# Patient Record
Sex: FEMALE | Race: BLACK OR AFRICAN AMERICAN
[De-identification: names, ages, dates, MRNs, and addresses within clinical notes are randomized per-mention and may not be internally consistent; named-entity substitution may affect disease eponyms.]

---

## 2019-08-14 ENCOUNTER — HOSPITAL ENCOUNTER (EMERGENCY)
Dept: HOSPITAL 10 - E/R | Age: 29
LOS: 1 days | Discharge: HOME | End: 2019-08-15
Payer: COMMERCIAL

## 2019-08-14 ENCOUNTER — HOSPITAL ENCOUNTER (EMERGENCY)
Dept: HOSPITAL 91 - E/R | Age: 29
LOS: 1 days | Discharge: HOME | End: 2019-08-15
Payer: COMMERCIAL

## 2019-08-14 VITALS
BODY MASS INDEX: 23.15 KG/M2 | WEIGHT: 135.58 LBS | HEIGHT: 64 IN | WEIGHT: 135.58 LBS | BODY MASS INDEX: 23.15 KG/M2 | HEIGHT: 64 IN

## 2019-08-14 DIAGNOSIS — F99: Primary | ICD-10-CM

## 2019-08-14 LAB
ACETAMINOPHEN: < 10 UG/ML (ref 10–30)
ADD MAN DIFF?: NO
ADD UMIC: YES
ALANINE AMINOTRANSFERASE: 18 IU/L (ref 13–69)
ALBUMIN/GLOBULIN RATIO: 1.06
ALBUMIN: 4.8 G/DL (ref 3.3–4.9)
ALKALINE PHOSPHATASE: 98 IU/L (ref 42–121)
ANION GAP: 13 (ref 5–13)
ASPARTATE AMINO TRANSFERASE: 34 IU/L (ref 15–46)
BASOPHIL #: 0.1 10^3/UL (ref 0–0.1)
BASOPHILS %: 0.9 % (ref 0–2)
BILIRUBIN,DIRECT: 0 MG/DL (ref 0–0.2)
BILIRUBIN,TOTAL: 1.1 MG/DL (ref 0.2–1.3)
BLOOD UREA NITROGEN: 13 MG/DL (ref 7–20)
CALCIUM: 9.9 MG/DL (ref 8.4–10.2)
CARBON DIOXIDE: 23 MMOL/L (ref 21–31)
CHLORIDE: 105 MMOL/L (ref 97–110)
CREATININE: 0.58 MG/DL (ref 0.44–1)
EOSINOPHILS #: 0.1 10^3/UL (ref 0–0.5)
EOSINOPHILS %: 1.4 % (ref 0–7)
ETHANOL: < 10 MG/DL (ref 0–0)
GLOBULIN: 4.5 G/DL (ref 1.3–3.2)
GLUCOSE: 85 MG/DL (ref 70–220)
HEMATOCRIT: 42.5 % (ref 37–47)
HEMOGLOBIN: 13.7 G/DL (ref 12–16)
IMMATURE GRANS #M: 0.02 10^3/UL (ref 0–0.03)
IMMATURE GRANS % (M): 0.2 % (ref 0–0.43)
LYMPHOCYTES #: 3.1 10^3/UL (ref 0.8–2.9)
LYMPHOCYTES %: 35.2 % (ref 15–51)
MEAN CORPUSCULAR HEMOGLOBIN: 31.2 PG (ref 29–33)
MEAN CORPUSCULAR HGB CONC: 32.2 G/DL (ref 32–37)
MEAN CORPUSCULAR VOLUME: 96.8 FL (ref 82–101)
MEAN PLATELET VOLUME: 10.2 FL (ref 7.4–10.4)
MONOCYTE #: 1 10^3/UL (ref 0.3–0.9)
MONOCYTES %: 11.5 % (ref 0–11)
NEUTROPHIL #: 4.5 10^3/UL (ref 1.6–7.5)
NEUTROPHILS %: 50.8 % (ref 39–77)
NUCLEATED RED BLOOD CELLS #: 0 10^3/UL (ref 0–0)
NUCLEATED RED BLOOD CELLS%: 0 /100WBC (ref 0–0)
PLATELET COUNT: 359 10^3/UL (ref 140–415)
POTASSIUM: 3.7 MMOL/L (ref 3.5–5.1)
RED BLOOD COUNT: 4.39 10^6/UL (ref 4.2–5.4)
RED CELL DISTRIBUTION WIDTH: 12 % (ref 11.5–14.5)
SALICYLATE: < 1 MG/DL (ref 5–30)
SODIUM: 141 MMOL/L (ref 135–144)
TOTAL PROTEIN: 9.3 G/DL (ref 6.1–8.1)
UR ASCORBIC ACID: 20 MG/DL
UR BACTERIA: (no result) /HPF
UR BILIRUBIN (DIP): NEGATIVE MG/DL
UR BLOOD (DIP): (no result) MG/DL
UR CLARITY: (no result)
UR COLOR: (no result)
UR GLUCOSE (DIP): NEGATIVE MG/DL
UR KETONES (DIP): (no result) MG/DL
UR LEUKOCYTE ESTERASE (DIP): NEGATIVE LEU/UL
UR MUCUS: (no result) /HPF
UR NITRITE (DIP): NEGATIVE MG/DL
UR PH (DIP): 5 (ref 5–9)
UR RBC: 3 /HPF (ref 0–5)
UR SPECIFIC GRAVITY (DIP): 1.03 (ref 1–1.03)
UR SQUAMOUS EPITHELIAL CELL: (no result) /HPF
UR TOTAL PROTEIN (DIP): NEGATIVE MG/DL
UR UROBILINOGEN (DIP): (no result) MG/DL
UR WBC: 3 /HPF (ref 0–5)
WHITE BLOOD COUNT: 8.9 10^3/UL (ref 4.8–10.8)

## 2019-08-14 PROCEDURE — 81025 URINE PREGNANCY TEST: CPT

## 2019-08-14 PROCEDURE — 81001 URINALYSIS AUTO W/SCOPE: CPT

## 2019-08-14 PROCEDURE — 99283 EMERGENCY DEPT VISIT LOW MDM: CPT

## 2019-08-14 PROCEDURE — 80053 COMPREHEN METABOLIC PANEL: CPT

## 2019-08-14 PROCEDURE — 80307 DRUG TEST PRSMV CHEM ANLYZR: CPT

## 2019-08-14 PROCEDURE — 36415 COLL VENOUS BLD VENIPUNCTURE: CPT

## 2019-08-14 PROCEDURE — 85025 COMPLETE CBC W/AUTO DIFF WBC: CPT

## 2019-08-14 NOTE — ERD
ER Documentation


Chief Complaint


Chief Complaint





REFERRED BY THERAPIST FOR ERRATIC BEHAVIOR. HX ANXIETY.





HPI


This is a 28-year-old woman with a history of bipolar chery referred here by her


therapist for recent agitation, anxiety, erratic behavior.  Patient states she 


has been unable to focus and think clearly and has not been using her 


medications (Zoloft) as prescribed.  She denies fevers or chills, no chest pain 


or shortness of breath, no suicidal homicidal ideation





ROS


All systems reviewed and are negative except as per history of present illness.





Allergies


Allergies:  


Uncoded Allergies:  


     GLUTEN (Allergy, Unknown, 8/14/19)





PMhx/Soc


Bipolar chery


Medical and Surgical Hx:  pt denies Medical Hx, pt denies Surgical Hx


Hx Psychiatric Problems:  Yes (anxiety )


Hx Alcohol Use:  No


Hx Substance Use:  No


Hx Tobacco Use:  No


Smoking Status:  Never smoker





FmHx


Family History:  No diabetes





Physical Exam


Vitals





Vital Signs


  Date      Temp  Pulse  Resp  B/P (MAP)   Pulse Ox  O2          O2 Flow    FiO2


Time                                                 Delivery    Rate


   8/14/19  98.6     98    26      139/70        97


     20:03                           (93)





Physical Exam


GENERAL: Well-developed, well-nourished, well-hydrated, agitated, afebrile


NEURO: Alert and oriented 3, cranial nerves II through XII intact bilaterally, 


pupils equal round reactive to light, no focal deficits or facial asymmetry, 


sensation intact distally Strength 5/5 in upper and lower extremities 


bilaterally


CARDIAC: Regular rate and rhythm, no murmurs rubs or gallops


LUNGS: Clear bilaterally no wheezing crackles or stridor


SKIN: Warm and dry to touch, no abrasions, contusions, or hematomas, no 


lacerations, no ecchymosis, no target lesions, and without ulcers


PSYCH: Agitated, bizarre affect


Result Diagram:  


8/14/19 2110 8/14/19 2110





Results 24 hrs





Laboratory Tests


Test
                              8/14/19
21:10    8/14/19
21:19  8/14/19
21:28


White Blood Count                   8.9 10^3/ul


Red Blood Count                    4.39 10^6/ul


Hemoglobin                            13.7 g/dl


Hematocrit                               42.5 %


Mean Corpuscular Volume                 96.8 fl


Mean Corpuscular Hemoglobin             31.2 pg


Mean Corpuscular                     32.2 g/dl 
  
                



Hemoglobin
Concent


Red Cell Distribution Width              12.0 %


Platelet Count                      359 10^3/UL


Mean Platelet Volume                    10.2 fl


Immature Granulocytes %                 0.200 %


Neutrophils %                            50.8 %


Lymphocytes %                            35.2 %


Monocytes %                              11.5 %


Eosinophils %                             1.4 %


Basophils %                               0.9 %


Nucleated Red Blood Cells %         0.0 /100WBC


Immature Granulocytes #           0.020 10^3/ul


Neutrophils #                       4.5 10^3/ul


Lymphocytes #                       3.1 10^3/ul


Monocytes #                         1.0 10^3/ul


Eosinophils #                       0.1 10^3/ul


Basophils #                         0.1 10^3/ul


Nucleated Red Blood Cells #         0.0 10^3/ul


Sodium Level                         141 mmol/L


Potassium Level                      3.7 mmol/L


Chloride Level                       105 mmol/L


Carbon Dioxide Level                  23 mmol/L


Anion Gap                                    13


Blood Urea Nitrogen                    13 mg/dl


Creatinine                           0.58 mg/dl


Est Glomerular Filtrat            > 60 mL/min 
   
                



Rate
mL/min


Glucose Level                          85 mg/dl


Calcium Level                         9.9 mg/dl


Total Bilirubin                       1.1 mg/dl


Direct Bilirubin                     0.00 mg/dl


Indirect Bilirubin                    1.1 mg/dl


Aspartate Amino                        34 IU/L 
  
                



Transf
(AST/SGOT)


Alanine                                18 IU/L 
  
                



Aminotransferase
(ALT/SGPT)


Alkaline Phosphatase                    98 IU/L


Total Protein                          9.3 g/dl


Albumin                                4.8 g/dl


Globulin                              4.50 g/dl


Albumin/Globulin Ratio                     1.06


Salicylates Level                 < 1.0 mg/dl


Acetaminophen Level               < 10.0 ug/ml


Ethyl Alcohol Level               < 10.0 mg/dl


Urine Color                                       DANISHA


Urine Clarity
                    
               SLIGHTLY
CLOUDY  



Urine pH                                                     5.0


Urine Specific Gravity                                     1.032


Urine Ketones                                     TRACE mg/dL


Urine Nitrite                                     NEGATIVE mg/dL


Urine Bilirubin                                   NEGATIVE mg/dL


Urine Urobilinogen                                      1+ mg/dL


Urine Leukocyte Esterase
         
               NEGATIVE
Mckenzie/ul  



Urine Microscopic RBC                                     3 /HPF


Urine Microscopic WBC                                     3 /HPF


Urine Squamous Epithelial
Cells   
               FEW /HPF 
       



Urine Bacteria                                    FEW /HPF


Urine Mucus                                       MANY /HPF


Urine Hemoglobin                                        3+ mg/dL


Urine Glucose                                     NEGATIVE mg/dL


Urine Total Protein                               NEGATIVE mg/dl


Urine Opiates Screen                              Negative


Urine Barbiturates                                Negative


Urine Amphetamines Screen                         Negative


Urine Benzodiazepines Screen                      Negative


Urine Cocaine Screen                              Negative


Urine Cannabinoids                                Negative


POC Beta HCG, Qualitative                                          NEGATIVE








Procedures/MDM


Security one-to-one watch was established and tele-psychiatry was contacted.





CBC and electrolytes are normal, liver function test normal, urinalysis is 


negative for infection, pregnancy test negative, ethanol level negative urine 


drug screen is negative





Patient's behavioral symptoms have stabilized while in the department.  Patient 


is medically cleared and appropriate for psychiatric evaluation and work up.


No e/o neurologic, toxic, infectious, or metabolic cause.





Tele-psychiatry evaluated the patient.





Departure


Diagnosis:  


   Primary Impression:  


   Psychological disorder


Condition:  Stable











ANNA ROWLAND MD             Aug 14, 2019 21:02

## 2019-08-15 VITALS — SYSTOLIC BLOOD PRESSURE: 136 MMHG | DIASTOLIC BLOOD PRESSURE: 65 MMHG | RESPIRATION RATE: 22 BRPM | HEART RATE: 85 BPM

## 2019-08-15 NOTE — PSY
Date/Time of Note


Date/Time of Note


DATE: 8/14/19 


TIME: 23:45





Psychiatric Subjective Eval


Consent


Pt consented to telemedicine:  Yes





Subjective Evaluation


Patient location:  emergency


Chief Complaint:  REFERRED BY THERAPIST FOR ERRATIC BEHAVIOR. HX ANXIETY.


Medical history


Problems


Medical Problems:


(1) Psychological disorder


Status: Acute  








Allergies:  


Coded Allergies:  


     gluten (Verified  Allergy, Unknown, 8/14/19)





Psychiatric Objective Eval


Mental Status Examination:


Laboratory Results





Laboratory Tests


Test
                              8/14/19
21:10    8/14/19
21:19  8/14/19
21:28


White Blood Count                   8.9 10^3/ul


Red Blood Count                    4.39 10^6/ul


Hemoglobin                            13.7 g/dl


Hematocrit                               42.5 %


Mean Corpuscular Volume                 96.8 fl


Mean Corpuscular Hemoglobin             31.2 pg


Mean Corpuscular                     32.2 g/dl 
  
                



Hemoglobin
Concent


Red Cell Distribution Width              12.0 %


Platelet Count                      359 10^3/UL


Mean Platelet Volume                    10.2 fl


Immature Granulocytes %                 0.200 %


Neutrophils %                            50.8 %


Lymphocytes %                            35.2 %


Monocytes %                              11.5 %


Eosinophils %                             1.4 %


Basophils %                               0.9 %


Nucleated Red Blood Cells %         0.0 /100WBC


Immature Granulocytes #           0.020 10^3/ul


Neutrophils #                       4.5 10^3/ul


Lymphocytes #                       3.1 10^3/ul


Monocytes #                         1.0 10^3/ul


Eosinophils #                       0.1 10^3/ul


Basophils #                         0.1 10^3/ul


Nucleated Red Blood Cells #         0.0 10^3/ul


Sodium Level                         141 mmol/L


Potassium Level                      3.7 mmol/L


Chloride Level                       105 mmol/L


Carbon Dioxide Level                  23 mmol/L


Anion Gap                                    13


Blood Urea Nitrogen                    13 mg/dl


Creatinine                           0.58 mg/dl


Est Glomerular Filtrat            > 60 mL/min 
   
                



Rate
mL/min


Glucose Level                          85 mg/dl


Calcium Level                         9.9 mg/dl


Total Bilirubin                       1.1 mg/dl


Direct Bilirubin                     0.00 mg/dl


Indirect Bilirubin                    1.1 mg/dl


Aspartate Amino                        34 IU/L 
  
                



Transf
(AST/SGOT)


Alanine                                18 IU/L 
  
                



Aminotransferase
(ALT/SGPT)


Alkaline Phosphatase                    98 IU/L


Total Protein                          9.3 g/dl


Albumin                                4.8 g/dl


Globulin                              4.50 g/dl


Albumin/Globulin Ratio                     1.06


Salicylates Level                 < 1.0 mg/dl


Acetaminophen Level               < 10.0 ug/ml


Ethyl Alcohol Level               < 10.0 mg/dl


Urine Color                                       DANISHA


Urine Clarity
                    
               SLIGHTLY
CLOUDY  



Urine pH                                                     5.0


Urine Specific Gravity                                     1.032


Urine Ketones                                     TRACE mg/dL


Urine Nitrite                                     NEGATIVE mg/dL


Urine Bilirubin                                   NEGATIVE mg/dL


Urine Urobilinogen                                      1+ mg/dL


Urine Leukocyte Esterase
         
               NEGATIVE
Mckenzie/ul  



Urine Microscopic RBC                                     3 /HPF


Urine Microscopic WBC                                     3 /HPF


Urine Squamous Epithelial
Cells   
               FEW /HPF 
       



Urine Bacteria                                    FEW /HPF


Urine Mucus                                       MANY /HPF


Urine Hemoglobin                                        3+ mg/dL


Urine Glucose                                     NEGATIVE mg/dL


Urine Total Protein                               NEGATIVE mg/dl


Urine Opiates Screen                              Negative


Urine Barbiturates                                Negative


Urine Amphetamines Screen                         Negative


Urine Benzodiazepines Screen                      Negative


Urine Cocaine Screen                              Negative


Urine Cannabinoids                                Negative


POC Beta HCG, Qualitative                                          NEGATIVE








Assessment and Plan


Recommendation/Plan


Discharge Disposition:  Community (home)


Legal Status:  Voluntary





Assessment


Additional comments:


IDENTIFYING INFORMATION:  28 year old Female patient who is currently located at


the hospital and for whom psychiatric consultation was requested. 





SOURCES OF INFORMATION: The patient who appears to be somewhat reliable and the 


medical records; the nursing staff.


Mom, Mark Mccullough, who appears to be reliable. 





CHIEF COMPLAINT: "my therapist sent me here".





HISTORY OF PRESENT ILLNESS:


The patient was interviewed via telemedicine in the presence of and under the 


supervision of nursing staff of the hospital. The consent to conducting this 


interview via telemedicine was obtained by the nursing staff at the hospital. 





VERENICE Palacios reports that the patient presented after she was sent to the ER by 


her therapist, for acting erratically, talking fast, laughing excessively.


Is not on a 5150 hold. 





The patient reports having anxiety, feels unsafe at home, because her boyfriend 


is controlling of me, messes with my head.. 


She reports that she has not been sleeping well, Has been anxious. 


The patient denies having AH, SI, persistent depression, anhedonia, 


hopelessness, helplessness, homicidal ideation.





The patient denies using alcohol heavily or regularly. 


The patient denies using any other substances.





In terms of past psychiatric history, the patient reports having a history of no


past psychiatric hospitalizations.


The patient reports having a history of no past suicide attempts. Past 


medication trials: zoloft.


The patient denies ever having a history of AH, delusions, persistent or serious


depression or anhedonia, manic or hypomanic episodes. 





Mom denies the pt having SI, HI, violent behavior, AH, delusions or having enga


ged in any type of dangerous behavior.





The pt has a therapist and psychiatrist, with unknown names and contact 


information.





PAST MEDICAL HISTORY: 


none.





CURRENT MEDICATIONS:


none.





ALLERGIES TO MEDICATIONS: 


wheat.





LABORATORY TESTS: 


CBC wnl,  CMP wnl,


UDS -, alcohol level not detected.





SOCIAL HISTORY: 


lives with family, single, has a boyfriend, no kids,  graduated from high 


school;


employed;  no access to firearms.





REVIEW OF SYSTEMS:


Constitutional (e.g., fever, weight loss): negative;   


Eyes, Ears, Nose, Mouth, Throat: negative;


Cardiovascular: negative;


Respiratory: negative;


Gastrointestinal: negative;


Genitourinary: negative;


Musculoskeletal: negative;


Integumentary (skin and/or breast): negative; 


Neurological: negative;


Psychiatric: as per HPI;


Endocrine: negative;


Hematologic/Lymphatic: negative; 


Allergic/Immunologic: negative.





MENTAL STATUS EXAMINATION: 


General Appearance and Behavior: Calm, cooperative with the interview, pleasant 


with the current interviewer, makes fair eye contact, fairly groomed, no 


abnormal movements noted,


Speech: Regular rate, regular rhythm, normal latency, normal volume, somewhat 


decreased amount,


Flow of thought: sequential, logical, goal-directed at times, illogical at 


times, 


Content of thought: no auditory hallucinations, no visual hallucinations, + 


delusions, 


negative for suicidal ideation; no homicidal ideation, 


Mood: "good",


Affect: somewhat euphoric, reactive,


Attention: normal based on the interview,


Insight: fair,


Judgment: poor,


Memory: normal based on the interview,


Sensorium: alert and oriented to person, place and date.





ASSESSMENT:


The patient's presentation and history are consistent with the diagnosis of 


unspecified psychotic disorder. 


The patient presents with moderate psychotic symptoms in the context of 


medication noncompliance, psychosocial stressors.





PLAN: 


- Medication management: 


Would administer risperidone 1 mg po x1 now and lorazepam 1 mg po x1 now. 


Would start risperidone 2 mg po qhs starting tomorrow night.


Risks, benefits, alternatives discussed in detail and the patient provided 


informed consent to proceed.








- Labs:


Please check TSH, vitamin B12, RPR.


please consider head CT if the pt has any neurologic sxs.





- Psychotherapy: Provided supportive psychotherapy and psychoeducation. 





- Disposition:


The patient is note interested in inpatient admission in psychiatry at this time


which was offered to the patient. The patient is not an imminent danger to self 


or others. The patient is motivated for outpatient treatment. The patient agrees


to be compliant with outpatient follow-up appointments and pharmacotherapy as 


indicated. Would recommend that the patient follows up with a psychiatrist. 


Resources for outpatient follow-up will be provided by the hospital staff. 





The patient's risk for completed suicide is moderate in comparison to the 


general population.


Risk factors include psychotic illness,. 


Protective factors include race, raine, age, absence of substance use disorder, 


major depressive disorder, anxiety disorder, personality disorder, no access to 


firearms, no history of past suicide attempts, no major chronic medical problems


 





The patient's risk for completed suicide cannot be modified more effectively 


with inpatient admission at this time. The patient is not an imminent danger to 


self or others at this time and does not meet the legal criteria for involuntary


admission.





Risks, benefits, alternatives were discussed and the patient provided informed 


consent to proceed with the above plan.


Mom is in agreement with the above plan, will supervise medication 


administration, and assist with outpatient appointments with the patients 


outpatient psychiatrist.


I called the emergency room physician who is taking care of the patient to 


discuss about the above plan but the emergency room physician is not available 


at this time. I left my phone number with the hospital staff requesting a 


callback so that the emergency room physician can reach me when they become 


available.











JEFF GAMA MD      Aug 15, 2019 00:03